# Patient Record
Sex: FEMALE | Race: WHITE | NOT HISPANIC OR LATINO | Employment: OTHER | URBAN - METROPOLITAN AREA
[De-identification: names, ages, dates, MRNs, and addresses within clinical notes are randomized per-mention and may not be internally consistent; named-entity substitution may affect disease eponyms.]

---

## 2023-03-09 LAB
NON-UH HIE A/G RATIO: 1.5
NON-UH HIE ALB: 4.1 G/DL (ref 3.4–5)
NON-UH HIE ALK PHOS: 78 UNIT/L (ref 46–116)
NON-UH HIE BILIRUBIN, TOTAL: 0.9 MG/DL (ref 0.2–1)
NON-UH HIE BUN/CREAT RATIO: 20
NON-UH HIE BUN: 20 MG/DL (ref 9–23)
NON-UH HIE CALCIUM: 9.9 MG/DL (ref 8.7–10.4)
NON-UH HIE CALCULATED LDL CHOLESTEROL: 75 MG/DL (ref 60–130)
NON-UH HIE CALCULATED OSMOLALITY: 276 MOSM/KG (ref 275–295)
NON-UH HIE CHLORIDE: 99 MMOL/L (ref 98–107)
NON-UH HIE CHOLESTEROL: 162 MG/DL (ref 100–200)
NON-UH HIE CO2, VENOUS: 28 MMOL/L (ref 20–31)
NON-UH HIE CREATININE: 1 MG/DL (ref 0.5–0.8)
NON-UH HIE GFR AA: >60
NON-UH HIE GLOBULIN: 2.8 G/DL
NON-UH HIE GLOMERULAR FILTRATION RATE: 53 ML/MIN/1.73M?
NON-UH HIE GLUCOSE: 120 MG/DL (ref 74–106)
NON-UH HIE GOT: 21 UNIT/L (ref 15–37)
NON-UH HIE GPT: 19 UNIT/L (ref 10–49)
NON-UH HIE HCT: 42.8 % (ref 36–46)
NON-UH HIE HDL CHOLESTEROL: 62 MG/DL (ref 40–60)
NON-UH HIE HGB A1C: 5.6 %
NON-UH HIE HGB: 14.7 G/DL (ref 12–16)
NON-UH HIE INSTR WBC ND: 9.1
NON-UH HIE K: 3.9 MMOL/L (ref 3.5–5.1)
NON-UH HIE MCH: 31.6 PG (ref 27–34)
NON-UH HIE MCHC: 34.4 G/DL (ref 32–37)
NON-UH HIE MCV: 92.1 FL (ref 80–100)
NON-UH HIE MPV: 7.6 FL (ref 7.4–10.4)
NON-UH HIE NA: 136 MMOL/L (ref 135–145)
NON-UH HIE PLATELET: 351 X10 (ref 150–450)
NON-UH HIE RBC: 4.65 X10 (ref 4.2–5.4)
NON-UH HIE RDW: 12.6 % (ref 11.5–14.5)
NON-UH HIE TOTAL CHOL/HDL CHOL RATIO: 2.6
NON-UH HIE TOTAL PROTEIN: 6.9 G/DL (ref 5.7–8.2)
NON-UH HIE TRIGLYCERIDES: 125 MG/DL (ref 30–150)
NON-UH HIE TSH: 1.47 UIU/ML (ref 0.55–4.78)
NON-UH HIE VIT D 25: 27 NG/ML
NON-UH HIE WBC: 9.1 X10 (ref 4.5–11)

## 2023-03-13 ENCOUNTER — TELEPHONE (OUTPATIENT)
Dept: PRIMARY CARE | Facility: CLINIC | Age: 83
End: 2023-03-13
Payer: MEDICARE

## 2023-03-13 NOTE — TELEPHONE ENCOUNTER
----- Message from Cat Cunha DO sent at 3/9/2023  8:20 PM EST -----  Please let pt know their blood counts, sugar, electrolytes, thyroid, liver, and kidney function are all normal and her cholesterol was good.    Her kidney function was slightly elevated at 1 (should be less than 0.8).    Her vitamin D level is low at 27(should be above 30).  I recommend they take at least 2000 units of an OTC vitamin D supplement daily and we will cont to monitor.

## 2023-05-26 ENCOUNTER — TELEPHONE (OUTPATIENT)
Dept: PRIMARY CARE | Facility: CLINIC | Age: 83
End: 2023-05-26
Payer: MEDICARE

## 2023-05-26 DIAGNOSIS — R41.3 MEMORY LOSS: ICD-10-CM

## 2023-05-26 DIAGNOSIS — R53.1 GENERALIZED WEAKNESS: ICD-10-CM

## 2023-05-26 DIAGNOSIS — R29.6 FREQUENT FALLS: ICD-10-CM

## 2023-05-26 PROBLEM — R42 DYSEQUILIBRIUM: Status: ACTIVE | Noted: 2023-05-26

## 2023-05-26 NOTE — TELEPHONE ENCOUNTER
Patients daughter left a message stating she has been falling a lot and refuses to do anything about it to help herself.  They are asking if you can order PT for her, but possibly also order Home Health.  Her and her  refuse to move into assisted living.  She is concerned about her mother as she always has bruises on her face from falling.

## 2023-08-08 ENCOUNTER — OFFICE VISIT (OUTPATIENT)
Dept: PRIMARY CARE | Facility: CLINIC | Age: 83
End: 2023-08-08
Payer: MEDICARE

## 2023-08-08 ENCOUNTER — LAB (OUTPATIENT)
Dept: LAB | Facility: LAB | Age: 83
End: 2023-08-08
Payer: MEDICARE

## 2023-08-08 VITALS
DIASTOLIC BLOOD PRESSURE: 84 MMHG | HEART RATE: 82 BPM | WEIGHT: 153.4 LBS | TEMPERATURE: 98.5 F | BODY MASS INDEX: 28.23 KG/M2 | SYSTOLIC BLOOD PRESSURE: 138 MMHG | HEIGHT: 62 IN

## 2023-08-08 DIAGNOSIS — R73.09 ELEVATED GLUCOSE: ICD-10-CM

## 2023-08-08 DIAGNOSIS — N39.0 ACUTE UTI: ICD-10-CM

## 2023-08-08 DIAGNOSIS — E55.9 MILD VITAMIN D DEFICIENCY: ICD-10-CM

## 2023-08-08 DIAGNOSIS — R42 DYSEQUILIBRIUM: ICD-10-CM

## 2023-08-08 DIAGNOSIS — R53.83 OTHER FATIGUE: ICD-10-CM

## 2023-08-08 DIAGNOSIS — R29.6 FREQUENT FALLS: ICD-10-CM

## 2023-08-08 DIAGNOSIS — I10 BENIGN ESSENTIAL HTN: ICD-10-CM

## 2023-08-08 DIAGNOSIS — R47.01 EXPRESSIVE APHASIA: ICD-10-CM

## 2023-08-08 DIAGNOSIS — Z87.828 HISTORY OF HEAD INJURY: ICD-10-CM

## 2023-08-08 DIAGNOSIS — Z29.89 NEED FOR PROPHYLAXIS AGAINST URINARY TRACT INFECTION: ICD-10-CM

## 2023-08-08 DIAGNOSIS — R53.83 OTHER FATIGUE: Primary | ICD-10-CM

## 2023-08-08 DIAGNOSIS — E27.9 DISORDER OF ADRENAL GLAND, UNSPECIFIED (MULTI): ICD-10-CM

## 2023-08-08 PROBLEM — K21.9 GERD (GASTROESOPHAGEAL REFLUX DISEASE): Status: ACTIVE | Noted: 2023-08-08

## 2023-08-08 PROBLEM — R30.0 DYSURIA: Status: ACTIVE | Noted: 2023-08-08

## 2023-08-08 PROBLEM — R35.0 URINE FREQUENCY: Status: ACTIVE | Noted: 2023-08-08

## 2023-08-08 PROBLEM — E78.5 HYPERLIPIDEMIA: Status: ACTIVE | Noted: 2023-08-08

## 2023-08-08 LAB
POC APPEARANCE, URINE: CLEAR
POC BILIRUBIN, URINE: NEGATIVE
POC BLOOD, URINE: NEGATIVE
POC COLOR, URINE: YELLOW
POC GLUCOSE, URINE: NEGATIVE MG/DL
POC KETONES, URINE: NEGATIVE MG/DL
POC LEUKOCYTES, URINE: NEGATIVE
POC NITRITE,URINE: NEGATIVE
POC PH, URINE: 5 PH
POC PROTEIN, URINE: NORMAL MG/DL
POC SPECIFIC GRAVITY, URINE: >=1.03
POC UROBILINOGEN, URINE: 0.2 EU/DL

## 2023-08-08 PROCEDURE — 82306 VITAMIN D 25 HYDROXY: CPT

## 2023-08-08 PROCEDURE — 81003 URINALYSIS AUTO W/O SCOPE: CPT | Performed by: FAMILY MEDICINE

## 2023-08-08 PROCEDURE — 3079F DIAST BP 80-89 MM HG: CPT | Performed by: FAMILY MEDICINE

## 2023-08-08 PROCEDURE — 99214 OFFICE O/P EST MOD 30 MIN: CPT | Performed by: FAMILY MEDICINE

## 2023-08-08 PROCEDURE — 87086 URINE CULTURE/COLONY COUNT: CPT

## 2023-08-08 PROCEDURE — 3075F SYST BP GE 130 - 139MM HG: CPT | Performed by: FAMILY MEDICINE

## 2023-08-08 PROCEDURE — 1160F RVW MEDS BY RX/DR IN RCRD: CPT | Performed by: FAMILY MEDICINE

## 2023-08-08 PROCEDURE — 1159F MED LIST DOCD IN RCRD: CPT | Performed by: FAMILY MEDICINE

## 2023-08-08 PROCEDURE — 36415 COLL VENOUS BLD VENIPUNCTURE: CPT

## 2023-08-08 PROCEDURE — 1036F TOBACCO NON-USER: CPT | Performed by: FAMILY MEDICINE

## 2023-08-08 PROCEDURE — 84443 ASSAY THYROID STIM HORMONE: CPT

## 2023-08-08 PROCEDURE — 80053 COMPREHEN METABOLIC PANEL: CPT

## 2023-08-08 PROCEDURE — 83036 HEMOGLOBIN GLYCOSYLATED A1C: CPT

## 2023-08-08 PROCEDURE — 85027 COMPLETE CBC AUTOMATED: CPT

## 2023-08-08 RX ORDER — LISINOPRIL 20 MG/1
2 TABLET ORAL DAILY
COMMUNITY
Start: 2014-06-09 | End: 2023-10-12 | Stop reason: ALTCHOICE

## 2023-08-08 RX ORDER — AMLODIPINE BESYLATE 5 MG/1
10 TABLET ORAL DAILY
COMMUNITY
Start: 2021-09-09 | End: 2023-10-12 | Stop reason: ALTCHOICE

## 2023-08-08 RX ORDER — CEPHALEXIN 250 MG/1
1 CAPSULE ORAL DAILY
COMMUNITY
Start: 2021-06-07 | End: 2023-08-08 | Stop reason: SDUPTHER

## 2023-08-08 RX ORDER — CEPHALEXIN 250 MG/1
250 CAPSULE ORAL DAILY
Qty: 90 CAPSULE | Refills: 1 | Status: SHIPPED | OUTPATIENT
Start: 2023-08-08 | End: 2023-10-13 | Stop reason: SDUPTHER

## 2023-08-08 RX ORDER — SIMVASTATIN 20 MG/1
1 TABLET, FILM COATED ORAL DAILY
COMMUNITY
Start: 2014-06-06 | End: 2023-10-12 | Stop reason: ALTCHOICE

## 2023-08-08 RX ORDER — NITROFURANTOIN 25; 75 MG/1; MG/1
100 CAPSULE ORAL 2 TIMES DAILY
Qty: 14 CAPSULE | Refills: 0 | Status: SHIPPED | OUTPATIENT
Start: 2023-08-08 | End: 2023-08-15

## 2023-08-08 RX ORDER — LATANOPROST 50 UG/ML
SOLUTION/ DROPS OPHTHALMIC
COMMUNITY
Start: 2016-09-22

## 2023-08-08 RX ORDER — OXYBUTYNIN CHLORIDE 5 MG/1
TABLET ORAL
COMMUNITY
Start: 2023-04-26 | End: 2023-08-08

## 2023-08-08 ASSESSMENT — PATIENT HEALTH QUESTIONNAIRE - PHQ9
2. FEELING DOWN, DEPRESSED OR HOPELESS: NOT AT ALL
SUM OF ALL RESPONSES TO PHQ9 QUESTIONS 1 AND 2: 0
1. LITTLE INTEREST OR PLEASURE IN DOING THINGS: NOT AT ALL

## 2023-08-08 NOTE — PROGRESS NOTES
"Subjective   Patient ID: Justyna Joe is a 83 y.o. female who presents for UTI (Urine frequency, slight dysuria.  Started a few days ago).    HPI   83-year-old female presents with her daughter co a few day hx of an increase in urinary frequency, urgency, and slight dysuria; has also had an inc in incontinence  No fever or chills. No gross hematuria.      hx HTN/HLD/prediabetes/vit D defic    History of disequilibrium and feeling off balance and some speech troubles/expressive aphasia and freq falls . Tried physical therapy and speech therapy.   had head CT 10/2021 and carotid US-neg had echo 11/2021;   never followed up with neurologist as recommended   Falling several times a wk.  Has hit her head. Never goes to ER.    Denies any one sided numbness, tingling, weakness  No vision changes    hx of renal cyst/adrenal adenoma-   dr acevedo urologist - has her on keflex 250mg daily for UTI prophylaxis and also on oxybutynin 5 mg - but she stopped the oxybutynin bc didn't feel it ws helping  Had biopsy of adrenal adenoma last yr- neg per pt    BMI 28; wt is down 7 lbs since feb visit and 17 lbs since last sept  Not eating as much  Lives at home with her  and her daughter lives close by and bring them meals. Are looking into meals on wheels.  denies any chest pains, palpitations, edema, shortness of breath, abdominal pains, reflux, nausea, vomiting,  constipation, blood in stool, melena.      GI dwaine- colonoscopy 8/2015 +polyps;   11/2020 cologuard-neg; occ looser stools and urgency         Review of Systems  ROS as stated in HPI    Objective   /84   Pulse 82   Temp 36.9 °C (98.5 °F)   Ht 1.575 m (5' 2\")   Wt 69.6 kg (153 lb 6.4 oz)   BMI 28.06 kg/m²     Physical Exam  Constitutional: A&O; NAD  HEENT: conjunctiva normal. EOMI; PERRLA; lids normal; TM's clear;   Neck: supple; No lymphadenopathy   Heart: RRR     Lungs: CTA bilateral   Abd: Soft, Nontender, Nondistended  Ext:  No edema;    Neuro: slow " speech;   Psych: Judgment, orientation, mood, affect, insight wnl   Assessment/Plan   Problem List Items Addressed This Visit       Dysequilibrium    Relevant Orders    Referral to Neurology    MR brain w and wo IV contrast    Acute UTI    Relevant Medications    nitrofurantoin, macrocrystal-monohydrate, (Macrobid) 100 mg capsule    Other Relevant Orders    Urine Culture    POCT UA Automated manually resulted (Completed)    Benign essential HTN    Relevant Orders    Comprehensive Metabolic Panel    Elevated glucose    Relevant Orders    Comprehensive Metabolic Panel    Hemoglobin A1C    Fatigue - Primary    Relevant Orders    CBC    TSH with reflex to Free T4 if abnormal    Mild vitamin D deficiency    Relevant Orders    Vitamin D, Total    Disorder of adrenal gland, unspecified (CMS/HCC)     Other Visit Diagnoses       Frequent falls        Relevant Orders    Referral to Neurology    MR brain w and wo IV contrast    Expressive aphasia        Relevant Orders    Referral to Neurology    MR brain w and wo IV contrast    History of head injury        Relevant Orders    MR brain w and wo IV contrast    Need for prophylaxis against urinary tract infection        Relevant Medications    cephalexin (Keflex) 250 mg capsule          Check UA/cx  Rx [id number]   Check MRI  Refer to neuro    check labs  monitor BP   Rx refill keflex

## 2023-08-09 ENCOUNTER — TELEPHONE (OUTPATIENT)
Dept: PRIMARY CARE | Facility: CLINIC | Age: 83
End: 2023-08-09
Payer: MEDICARE

## 2023-08-09 LAB
ALANINE AMINOTRANSFERASE (SGPT) (U/L) IN SER/PLAS: 15 U/L (ref 7–45)
ALBUMIN (G/DL) IN SER/PLAS: 4.6 G/DL (ref 3.4–5)
ALKALINE PHOSPHATASE (U/L) IN SER/PLAS: 59 U/L (ref 33–136)
ANION GAP IN SER/PLAS: 14 MMOL/L (ref 10–20)
ASPARTATE AMINOTRANSFERASE (SGOT) (U/L) IN SER/PLAS: 17 U/L (ref 9–39)
BILIRUBIN TOTAL (MG/DL) IN SER/PLAS: 0.6 MG/DL (ref 0–1.2)
CALCIDIOL (25 OH VITAMIN D3) (NG/ML) IN SER/PLAS: 30 NG/ML
CALCIUM (MG/DL) IN SER/PLAS: 10 MG/DL (ref 8.6–10.6)
CARBON DIOXIDE, TOTAL (MMOL/L) IN SER/PLAS: 24 MMOL/L (ref 21–32)
CHLORIDE (MMOL/L) IN SER/PLAS: 101 MMOL/L (ref 98–107)
CREATININE (MG/DL) IN SER/PLAS: 0.83 MG/DL (ref 0.5–1.05)
ERYTHROCYTE DISTRIBUTION WIDTH (RATIO) BY AUTOMATED COUNT: 12.5 % (ref 11.5–14.5)
ERYTHROCYTE MEAN CORPUSCULAR HEMOGLOBIN CONCENTRATION (G/DL) BY AUTOMATED: 33.2 G/DL (ref 32–36)
ERYTHROCYTE MEAN CORPUSCULAR VOLUME (FL) BY AUTOMATED COUNT: 95 FL (ref 80–100)
ERYTHROCYTES (10*6/UL) IN BLOOD BY AUTOMATED COUNT: 4.58 X10E12/L (ref 4–5.2)
ESTIMATED AVERAGE GLUCOSE FOR HBA1C: 120 MG/DL
GFR FEMALE: 70 ML/MIN/1.73M2
GLUCOSE (MG/DL) IN SER/PLAS: 102 MG/DL (ref 74–99)
HEMATOCRIT (%) IN BLOOD BY AUTOMATED COUNT: 43.7 % (ref 36–46)
HEMOGLOBIN (G/DL) IN BLOOD: 14.5 G/DL (ref 12–16)
HEMOGLOBIN A1C/HEMOGLOBIN TOTAL IN BLOOD: 5.8 %
LEUKOCYTES (10*3/UL) IN BLOOD BY AUTOMATED COUNT: 8.4 X10E9/L (ref 4.4–11.3)
NRBC (PER 100 WBCS) BY AUTOMATED COUNT: 0 /100 WBC (ref 0–0)
PLATELETS (10*3/UL) IN BLOOD AUTOMATED COUNT: 353 X10E9/L (ref 150–450)
POTASSIUM (MMOL/L) IN SER/PLAS: 4.3 MMOL/L (ref 3.5–5.3)
PROTEIN TOTAL: 6.9 G/DL (ref 6.4–8.2)
SODIUM (MMOL/L) IN SER/PLAS: 135 MMOL/L (ref 136–145)
THYROTROPIN (MIU/L) IN SER/PLAS BY DETECTION LIMIT <= 0.05 MIU/L: 1.72 MIU/L (ref 0.44–3.98)
UREA NITROGEN (MG/DL) IN SER/PLAS: 26 MG/DL (ref 6–23)
URINE CULTURE: NORMAL

## 2023-08-09 NOTE — TELEPHONE ENCOUNTER
----- Message from Cat Cunha DO sent at 8/9/2023 11:01 AM EDT -----  Please let pt know that her blood counts,  thyroid, liver, and kidney function are all normal  Her sodium was slightly low at 135(should be above 136)  Her sugar was slightly elevated at 102 and her HbA1c, the 3 month average of her sugars was 5.8, which is in the pre-diabetic range (anything 6.5 and above is considered diabetic).  I recommend a healthy diet and exercise and we will continue to monitor.   Her  vit D was at lower end of normal at 30(normal range is ) so I recommend she take at least 2000 units of an OTC vitamin D supplement daily and we will cont to monitor.

## 2023-08-10 ENCOUNTER — TELEPHONE (OUTPATIENT)
Dept: PRIMARY CARE | Facility: CLINIC | Age: 83
End: 2023-08-10
Payer: MEDICARE

## 2023-08-10 NOTE — TELEPHONE ENCOUNTER
----- Message from Cat Cunha DO sent at 8/9/2023  3:01 PM EDT -----  Please let pt and her daughter know her urine culture was negative for an infection so I recommend she follow up with her urologist if symptoms persist.

## 2023-08-21 ENCOUNTER — TELEPHONE (OUTPATIENT)
Dept: PRIMARY CARE | Facility: CLINIC | Age: 83
End: 2023-08-21
Payer: MEDICARE

## 2023-08-21 NOTE — TELEPHONE ENCOUNTER
The family   would like the name of the medication that the patient is on to prevent utis. If you could call them at  [phone].

## 2023-09-05 ENCOUNTER — TELEPHONE (OUTPATIENT)
Dept: PRIMARY CARE | Facility: CLINIC | Age: 83
End: 2023-09-05
Payer: MEDICARE

## 2023-09-05 NOTE — TELEPHONE ENCOUNTER
Pt's daughter, Lara called. Her mother, Justyna Lucia, was at [facility]. Surgeon, Dr Chapman wants her to take Cipro, 500 mg. But she has had a bad rash from that in the past. They want to start her on it. Infectious disease dr needs to know more details about the reactions she was having. She is on an IV antibiotic until 9/16. Please call daughter, Lara at [phone]

## 2023-09-06 NOTE — TELEPHONE ENCOUNTER
Informed patients daughter that was all we had.  They will start her on cipro and benadryl and see how she does.

## 2023-09-14 ENCOUNTER — TELEPHONE (OUTPATIENT)
Dept: PRIMARY CARE | Facility: CLINIC | Age: 83
End: 2023-09-14
Payer: MEDICARE

## 2023-09-14 NOTE — TELEPHONE ENCOUNTER
[facility] called to get a verbal order for patient. She is still in sniff, but will be released next week and will need PT and Speech Therapy.  Their number is [phone].

## 2023-09-19 ENCOUNTER — TELEPHONE (OUTPATIENT)
Dept: PRIMARY CARE | Facility: CLINIC | Age: 83
End: 2023-09-19
Payer: MEDICARE

## 2023-09-19 NOTE — TELEPHONE ENCOUNTER
Pt daughter called and said she fell and was in sw for a while and currently getting moved to [facility].

## 2023-09-19 NOTE — TELEPHONE ENCOUNTER
Home health care started therapy, ot, pt, speech therapy yesterday.  They would like a verbal order to start [facility].  [phone]

## 2023-09-21 ENCOUNTER — APPOINTMENT (OUTPATIENT)
Dept: PRIMARY CARE | Facility: CLINIC | Age: 83
End: 2023-09-21
Payer: MEDICARE

## 2023-09-27 ENCOUNTER — TELEPHONE (OUTPATIENT)
Dept: PRIMARY CARE | Facility: CLINIC | Age: 83
End: 2023-09-27
Payer: MEDICARE

## 2023-09-27 NOTE — TELEPHONE ENCOUNTER
[facility] called and said Justyna fell on 9/25 getting out of bed because she didn't use her walker.  Her family was there and she is ok no reported injuries.

## 2023-10-12 ENCOUNTER — OFFICE VISIT (OUTPATIENT)
Dept: PRIMARY CARE | Facility: CLINIC | Age: 83
End: 2023-10-12
Payer: MEDICARE

## 2023-10-12 VITALS
TEMPERATURE: 97.3 F | WEIGHT: 162.4 LBS | DIASTOLIC BLOOD PRESSURE: 83 MMHG | HEIGHT: 62 IN | SYSTOLIC BLOOD PRESSURE: 153 MMHG | HEART RATE: 74 BPM | BODY MASS INDEX: 29.88 KG/M2

## 2023-10-12 DIAGNOSIS — R35.0 URINE FREQUENCY: ICD-10-CM

## 2023-10-12 DIAGNOSIS — Z29.89 NEED FOR PROPHYLAXIS AGAINST URINARY TRACT INFECTION: ICD-10-CM

## 2023-10-12 DIAGNOSIS — E78.5 HYPERLIPIDEMIA, UNSPECIFIED HYPERLIPIDEMIA TYPE: ICD-10-CM

## 2023-10-12 LAB
POC APPEARANCE, URINE: CLEAR
POC BILIRUBIN, URINE: ABNORMAL
POC BLOOD, URINE: NEGATIVE
POC COLOR, URINE: YELLOW
POC GLUCOSE, URINE: NEGATIVE MG/DL
POC KETONES, URINE: NEGATIVE MG/DL
POC LEUKOCYTES, URINE: NEGATIVE
POC NITRITE,URINE: NEGATIVE
POC PH, URINE: 5.5 PH
POC PROTEIN, URINE: ABNORMAL MG/DL
POC SPECIFIC GRAVITY, URINE: >=1.03
POC UROBILINOGEN, URINE: 0.2 EU/DL

## 2023-10-12 PROCEDURE — 99214 OFFICE O/P EST MOD 30 MIN: CPT | Performed by: FAMILY MEDICINE

## 2023-10-12 PROCEDURE — 3077F SYST BP >= 140 MM HG: CPT | Performed by: FAMILY MEDICINE

## 2023-10-12 PROCEDURE — 3079F DIAST BP 80-89 MM HG: CPT | Performed by: FAMILY MEDICINE

## 2023-10-12 PROCEDURE — 87086 URINE CULTURE/COLONY COUNT: CPT

## 2023-10-12 PROCEDURE — 1036F TOBACCO NON-USER: CPT | Performed by: FAMILY MEDICINE

## 2023-10-12 PROCEDURE — 81003 URINALYSIS AUTO W/O SCOPE: CPT | Performed by: FAMILY MEDICINE

## 2023-10-12 PROCEDURE — 1160F RVW MEDS BY RX/DR IN RCRD: CPT | Performed by: FAMILY MEDICINE

## 2023-10-12 PROCEDURE — 1159F MED LIST DOCD IN RCRD: CPT | Performed by: FAMILY MEDICINE

## 2023-10-12 RX ORDER — ATORVASTATIN CALCIUM 10 MG/1
10 TABLET, FILM COATED ORAL DAILY
COMMUNITY
End: 2023-10-12 | Stop reason: SDUPTHER

## 2023-10-12 RX ORDER — DORZOLAMIDE HYDROCHLORIDE AND TIMOLOL MALEATE 20; 5 MG/ML; MG/ML
1 SOLUTION/ DROPS OPHTHALMIC
COMMUNITY
Start: 2016-11-08

## 2023-10-12 RX ORDER — AMLODIPINE BESYLATE 10 MG/1
10 TABLET ORAL DAILY
COMMUNITY

## 2023-10-12 RX ORDER — MELATONIN 3 MG
1 CAPSULE ORAL NIGHTLY
COMMUNITY

## 2023-10-12 RX ORDER — LISINOPRIL 40 MG/1
20 TABLET ORAL 2 TIMES DAILY
COMMUNITY

## 2023-10-12 RX ORDER — CALCIUM CARBONATE 600 MG
1 TABLET ORAL DAILY
COMMUNITY
Start: 2014-06-09

## 2023-10-12 ASSESSMENT — PATIENT HEALTH QUESTIONNAIRE - PHQ9
SUM OF ALL RESPONSES TO PHQ9 QUESTIONS 1 AND 2: 0
1. LITTLE INTEREST OR PLEASURE IN DOING THINGS: NOT AT ALL
2. FEELING DOWN, DEPRESSED OR HOPELESS: NOT AT ALL

## 2023-10-12 NOTE — PROGRESS NOTES
"Subjective   Patient ID: Justyna Joe is a 83 y.o. female who presents for UTI (Urine frequency with dysuria for a week.).    HPI 83-year-old female presents with her daughters for fu and to check for a UTI  Has been getting up freq at night to urinate.   Was hospitalized in aug after a fall and had a lum patient lumbar laminectomy with dr sherwood and then had an infection after and has been seeing wound clinic  is almost done with her cipro  She had been on keflex 250mg daily in past for UTI prophylaxis from her Urolgist and they would like to restart after the cipro       hx HTN/HLD/prediabetes/vit D defic     History of disequilibrium and feeling off balance and some speech troubles/expressive aphasia and freq falls   Has upcoming appt with neuro      hx of renal cyst/adrenal adenoma-   dr acevedo urologist - had tried her  on oxybutynin 5 mg - but she stopped the oxybutynin bc didn't feel it ws helping  Had biopsy of adrenal adenoma in past- neg per pt     BMI 29;   wt is back up a little  Has moved into an apt and will be transitioning to assisted living  Getting meals there; appetite good  Her daughter has been staying with her and her  to help    denies any chest pains, palpitations, edema, shortness of breath, abdominal pains, reflux, nausea, vomiting,  constipation, blood in stool, melena.       GI dwaine- colonoscopy 8/2015 +polyps;   11/2020 cologuard-neg; occ looser stools and urgency       Review of Systems  ROS as stated in HP  Objective   /83   Pulse 74   Temp 36.3 °C (97.3 °F)   Ht 1.575 m (5' 2\")   Wt 73.7 kg (162 lb 6.4 oz)   BMI 29.70 kg/m²     Physical Exam  Constitutional: A&O; NAD  HEENT: conjunctiva normal. EOMI; PERRLA; lids normal; TM's clear;   Neck: supple; No lymphadenopathy   Heart: RRR     Lungs: CTA bilateral   Abd: Soft, Nontender, Nondistended  Ext:  No edema;      Psych: Judgment, orientation, mood, affect, insight wnl   Assessment/Plan   Problem List Items Addressed " This Visit             ICD-10-CM    Hyperlipidemia E78.5    Urine frequency R35.0    Relevant Orders    POCT UA Automated manually resulted (Completed)    Urine Culture        Check UA/cx  Restart daily keflex   Consider fu with urologist  Has upcoming appt with neuro    monitor BP  Rx refill atorvastatin   Will fill out physical forms for assisted living facility

## 2023-10-13 LAB — BACTERIA UR CULT: NORMAL

## 2023-10-13 RX ORDER — CEPHALEXIN 250 MG/1
250 CAPSULE ORAL DAILY
Qty: 90 CAPSULE | Refills: 1 | Status: SHIPPED | OUTPATIENT
Start: 2023-10-13

## 2023-10-13 RX ORDER — ATORVASTATIN CALCIUM 10 MG/1
10 TABLET, FILM COATED ORAL DAILY
Qty: 90 TABLET | Refills: 1 | Status: SHIPPED | OUTPATIENT
Start: 2023-10-13

## 2023-10-16 ENCOUNTER — TELEPHONE (OUTPATIENT)
Dept: PRIMARY CARE | Facility: CLINIC | Age: 83
End: 2023-10-16
Payer: MEDICARE

## 2023-10-16 NOTE — TELEPHONE ENCOUNTER
----- Message from Cat Cunha DO sent at 10/14/2023 10:26 AM EDT -----  Please let pt and her daughters know that her urine culture was negative for an infection.

## 2023-10-30 PROBLEM — E66.9 OBESITY: Status: ACTIVE | Noted: 2023-10-30

## 2023-10-30 PROBLEM — M62.81 MUSCLE WEAKNESS (GENERALIZED): Status: ACTIVE | Noted: 2023-10-30

## 2023-10-30 PROBLEM — D35.00 ADRENAL ADENOMA: Status: ACTIVE | Noted: 2023-10-30

## 2023-10-30 PROBLEM — E87.1 HYPONATREMIA: Status: ACTIVE | Noted: 2023-10-30

## 2023-10-30 PROBLEM — T81.49XA POSTOPERATIVE WOUND INFECTION: Status: ACTIVE | Noted: 2023-08-28

## 2023-10-30 PROBLEM — I10 HYPERTENSION: Status: ACTIVE | Noted: 2023-10-30

## 2023-10-30 PROBLEM — R29.6 RECURRENT FALLS: Status: ACTIVE | Noted: 2023-10-30

## 2023-10-30 PROBLEM — M48.00 SPINAL STENOSIS, SITE UNSPECIFIED: Status: ACTIVE | Noted: 2023-10-30

## 2023-10-30 PROBLEM — H40.9 GLAUCOMA: Status: ACTIVE | Noted: 2023-10-30

## 2023-10-30 PROBLEM — M54.2 NECK PAIN: Status: ACTIVE | Noted: 2023-10-30

## 2023-10-30 PROBLEM — R26.89 OTHER ABNORMALITIES OF GAIT AND MOBILITY: Status: ACTIVE | Noted: 2023-10-30

## 2023-10-30 PROBLEM — R41.3 MEMORY IMPAIRMENT: Status: ACTIVE | Noted: 2023-10-30

## 2023-10-30 PROBLEM — R13.12 DYSPHAGIA, OROPHARYNGEAL PHASE: Status: ACTIVE | Noted: 2023-10-30

## 2023-10-30 PROBLEM — N28.1 RENAL CYST: Status: ACTIVE | Noted: 2023-10-30

## 2023-10-30 PROBLEM — M19.90 DEGENERATIVE JOINT DISEASE: Status: ACTIVE | Noted: 2023-10-30

## 2023-10-30 PROBLEM — F09 MILD COGNITIVE DISORDER: Status: ACTIVE | Noted: 2023-10-30

## 2023-10-30 PROBLEM — I35.1 MODERATE AORTIC REGURGITATION: Status: ACTIVE | Noted: 2023-10-30

## 2023-10-30 PROBLEM — K63.5 POLYP OF COLON: Status: ACTIVE | Noted: 2023-10-30

## 2023-10-30 PROBLEM — I49.9 IRREGULAR HEART RHYTHM: Status: ACTIVE | Noted: 2023-10-30

## 2023-10-30 PROBLEM — M19.90 ARTHRITIS: Status: ACTIVE | Noted: 2023-10-30

## 2023-10-30 RX ORDER — DOCUSATE SODIUM 100 MG/1
100 CAPSULE, LIQUID FILLED ORAL
COMMUNITY
Start: 2023-08-28

## 2023-10-30 RX ORDER — SELENIUM 50 MCG
1 TABLET ORAL DAILY
COMMUNITY
Start: 2023-08-28

## 2023-10-30 RX ORDER — TRAMADOL HYDROCHLORIDE 5 MG/ML
12.5 SOLUTION ORAL ONCE
COMMUNITY
Start: 2023-09-01

## 2023-10-30 RX ORDER — MULTIVITAMIN
1 TABLET ORAL DAILY
COMMUNITY
Start: 2023-08-28

## 2023-11-01 ENCOUNTER — OFFICE VISIT (OUTPATIENT)
Dept: NEUROLOGY | Facility: CLINIC | Age: 83
End: 2023-11-01
Payer: MEDICARE

## 2023-11-01 VITALS
BODY MASS INDEX: 31.84 KG/M2 | HEART RATE: 98 BPM | HEIGHT: 60 IN | DIASTOLIC BLOOD PRESSURE: 77 MMHG | SYSTOLIC BLOOD PRESSURE: 178 MMHG | WEIGHT: 162.2 LBS

## 2023-11-01 DIAGNOSIS — F09 MILD COGNITIVE DISORDER: Primary | ICD-10-CM

## 2023-11-01 DIAGNOSIS — R42 DYSEQUILIBRIUM: ICD-10-CM

## 2023-11-01 DIAGNOSIS — R47.01 EXPRESSIVE APHASIA: ICD-10-CM

## 2023-11-01 DIAGNOSIS — R29.6 FREQUENT FALLS: ICD-10-CM

## 2023-11-01 DIAGNOSIS — M62.81 MUSCLE WEAKNESS (GENERALIZED): ICD-10-CM

## 2023-11-01 PROBLEM — K44.9 HERNIA, HIATAL: Status: ACTIVE | Noted: 2023-08-08

## 2023-11-01 PROCEDURE — 1159F MED LIST DOCD IN RCRD: CPT | Performed by: PSYCHIATRY & NEUROLOGY

## 2023-11-01 PROCEDURE — 3078F DIAST BP <80 MM HG: CPT | Performed by: PSYCHIATRY & NEUROLOGY

## 2023-11-01 PROCEDURE — 1160F RVW MEDS BY RX/DR IN RCRD: CPT | Performed by: PSYCHIATRY & NEUROLOGY

## 2023-11-01 PROCEDURE — 99205 OFFICE O/P NEW HI 60 MIN: CPT | Performed by: PSYCHIATRY & NEUROLOGY

## 2023-11-01 PROCEDURE — 3077F SYST BP >= 140 MM HG: CPT | Performed by: PSYCHIATRY & NEUROLOGY

## 2023-11-01 PROCEDURE — 1126F AMNT PAIN NOTED NONE PRSNT: CPT | Performed by: PSYCHIATRY & NEUROLOGY

## 2023-11-01 PROCEDURE — 1036F TOBACCO NON-USER: CPT | Performed by: PSYCHIATRY & NEUROLOGY

## 2023-11-01 RX ORDER — SIMVASTATIN 20 MG/1
20 TABLET, FILM COATED ORAL NIGHTLY
COMMUNITY

## 2023-11-01 RX ORDER — AMLODIPINE BESYLATE 5 MG/1
5 TABLET ORAL DAILY
COMMUNITY
Start: 2022-08-29

## 2023-11-01 ASSESSMENT — PATIENT HEALTH QUESTIONNAIRE - PHQ9
SUM OF ALL RESPONSES TO PHQ9 QUESTIONS 1 AND 2: 0
2. FEELING DOWN, DEPRESSED OR HOPELESS: NOT AT ALL
1. LITTLE INTEREST OR PLEASURE IN DOING THINGS: NOT AT ALL

## 2023-11-01 ASSESSMENT — ENCOUNTER SYMPTOMS
WEAKNESS: 1
FATIGUE: 1
LIGHT-HEADEDNESS: 1
SPEECH DIFFICULTY: 1

## 2023-11-01 ASSESSMENT — PAIN SCALES - GENERAL: PAINLEVEL: 0-NO PAIN

## 2023-11-01 NOTE — PATIENT INSTRUCTIONS
Would like you to get an MRI C and T spine for further evaluation as well as blood work. Will get the MRI brain pushed into the  system for my review.  Please get Neuro-psych testing for further evaluation of the memory.  Follow up after the Neuro-psych testing (call to schedule a follow up about 1 month after the testing).      Try melatonin up to 10 mg at 7 PM.  If not helping with the restless leg then can try low dose gabapentin.

## 2023-11-01 NOTE — PROGRESS NOTES
Subjective     Justyna Joe is a right handed  83 y.o. year old female who presents with Fall (NPV; frequent falls). Patient is accompanied by: child (2 daughters) .    Visit type: new patient visit    Fall         She has been having a problem with her balance. She can walk straight if she is looking straight however as soon as her head she will fall that direction.  She has gone through PT and OT for the past two months however its not fixing the problem.  There is no dizziness.    Its been going on for at least 8 months.      She had cauda equina - she is more upright after that however she has not had any significant improvement.  Her right leg drags behind the walker.  She can't lift her right arm as high as the left side.  The PT thinks se had a left sided stroke.  Last fall and she fell and hit her head on the bathtub.  She thinks she passed out before.   After she fell she had a decline with the speech.  It kept declining.  She had two surgeries back to back. First August 15th and August 29th. Went back in for concern of infection.     She was only getting 1-2 words out and that has improved. She has been talking in full sentences since the surgery.      At night she will get up every 20 minutes to urinate starting around 3 AM.  She also seems confused at night.  There are no hallucinations.  Sometimes she has episodes where she yells out and that is new.  She has periods where she is confused for days and then days where she is normal.  These happen twice week.  Some changes in behavior with manners.     There is a family history of dementia - Aunt and mother and her brother had frontotemporal dementia. A 1st cousin had frontotemporal dementia with ALS.       Had cognitive evaluation with OT and speech which she did ok with.     Review of Systems   Constitutional:  Positive for fatigue.   Musculoskeletal:  Positive for gait problem.   Neurological:  Positive for speech difficulty, weakness and  light-headedness.   All other systems reviewed and are negative.    All other systems have been reviewed and are negative for complaint.     Past Medical History:   Diagnosis Date    Other specified abnormal findings of blood chemistry     Elevated LFTs    Other specified abnormal findings of blood chemistry 09/09/2019    High serum lactate    Other specified abnormal findings of blood chemistry     Elevated serum creatinine    Personal history of diseases of the blood and blood-forming organs and certain disorders involving the immune mechanism     History of thrombocytosis    Personal history of diseases of the blood and blood-forming organs and certain disorders involving the immune mechanism 09/09/2019    History of anemia    Personal history of other diseases of the digestive system 11/21/2016    History of esophageal reflux    Personal history of other infectious and parasitic diseases     History of herpes zoster    Radiculopathy, lumbar region 01/05/2017    Lumbar radicular pain     Family History   Problem Relation Name Age of Onset    Dementia Mother      Multiple myeloma Mother      Prostate cancer Father      Dementia Brother      Pancreatic cancer Brother       Past Surgical History:   Procedure Laterality Date    APPENDECTOMY  06/06/2014    Appendectomy    LUMBAR LAMINECTOMY      TONSILLECTOMY  06/06/2014    Tonsillectomy    TOTAL ABDOMINAL HYSTERECTOMY W/ BILATERAL SALPINGOOPHORECTOMY  06/06/2014    Total Abdominal Hysterectomy With Removal Of Both Ovaries    TUBAL LIGATION  09/22/2016    Tubal Ligation      Social History     Tobacco Use    Smoking status: Never    Smokeless tobacco: Never   Substance Use Topics    Alcohol use: Not Currently          Objective   Vitals Reviewed.   Neurological Exam    Physical Exam      On general examination, the patient is well appearing and well groomed. Heart is regular, rate and rhythm. Lungs are clear to ausculation bilaterally. There is no peripheral edema.  Normal pedal pulses bilaterally. No carotid bruits.   On neurologic examination, the patient relies on her family to provide the history. She has a soft hoarse voice. The history is related in quite good detail with no obvious deficit of attention, memory or language. Fund of knowledge is adequate. Orientation is intact to person, place and time. There is mild word finding difficulties and sentences are fairly simple with couple word answers.  On cranial nerve exam, the pupils are equal, round and reactive to light. Extraocular movements are full. Visual fields are full to confrontation. There is no bulbofacial weakness or ptosis. The tongue is midline with no wasting or fasciculations. The palate elevates symmetrically. Facial sensation is intact to light touch and pinprick bilaterally. Hearing is intact to finger rub bilaterally. Shoulder shrug is 5/5 bilaterally.  Neck flexion and extension have full strength. Motor examination reveals increased tone in the legs, right side more than left. There is paratonia in the right upper extremity with a palmar grasp reflex present on the right. There is full strength in the proximal and distal muscles of the upper and lower extremities bilaterally. There is no scapular winging. Deep tendon reflexes are brisk at the biceps, 3/4 at the triceps bilaterally and 3/4 at the knee and ankle. Plantar responses are mute on the right and down on the left.  There is no tremor. On coordination testing, finger-nose-finger and heel-knee-shin testing are performed more slowly on the right compared to the left without dysmetria.  Sensory examination reveals absent vibratory sense to the knee bilaterally. Gait is wide based and unsteady with en bloc turns.     Assessment/Plan   Ms. Joe is a 83-year-old woman presenting to the neurology clinic today for initial evaluation of gait disturbance.  The patient's family describes at least an 8-month history of progressive decline in gait,  speech changes and cognitive changes.  She was found to have cauda equina status post surgery without any improvement in her gait.  She does seem to have more apraxia in the right upper and lower extremity as well as increased tone in the legs more predominant on the right side.  Given the signs of myelopathy despite the history of cauda equina recommending spine imaging of the cervical and thoracic spine.  Given her cognitive changes I am recommending neuropsychological testing and I will get a copy of her MRI of the brain from [facility] pushed into the  system for my review.  Suspect an underlying neurodegenerative process at this time given reported history and exam findings.      Follow-up after the neuropsych testing.    Referral to PT for gait imbalance.    Jessica Mena, DO

## 2023-11-02 ENCOUNTER — TELEPHONE (OUTPATIENT)
Dept: NEUROLOGY | Facility: CLINIC | Age: 83
End: 2023-11-02
Payer: MEDICARE

## 2023-11-08 DIAGNOSIS — R41.3 MEMORY IMPAIRMENT: Primary | ICD-10-CM

## 2023-11-20 ENCOUNTER — INPATIENT HOSPITAL (INPATIENT)
Dept: URBAN - METROPOLITAN AREA HOSPITAL 50 | Facility: HOSPITAL | Age: 83
End: 2023-11-20
Payer: MEDICARE

## 2023-11-20 DIAGNOSIS — R14.0 ABDOMINAL DISTENSION (GASEOUS): ICD-10-CM

## 2023-11-20 DIAGNOSIS — N39.0 URINARY TRACT INFECTION, SITE NOT SPECIFIED: ICD-10-CM

## 2023-11-20 DIAGNOSIS — R19.7 DIARRHEA, UNSPECIFIED: ICD-10-CM

## 2023-11-20 DIAGNOSIS — R93.3 ABNORMAL FINDINGS ON DIAGNOSTIC IMAGING OF OTHER PARTS OF DI: ICD-10-CM

## 2023-11-20 PROCEDURE — 99222 1ST HOSP IP/OBS MODERATE 55: CPT | Performed by: INTERNAL MEDICINE

## 2023-11-21 ENCOUNTER — INPATIENT HOSPITAL (INPATIENT)
Dept: URBAN - METROPOLITAN AREA HOSPITAL 50 | Facility: HOSPITAL | Age: 83
End: 2023-11-21
Payer: MEDICARE

## 2023-11-21 DIAGNOSIS — R19.7 DIARRHEA, UNSPECIFIED: ICD-10-CM

## 2023-11-21 DIAGNOSIS — R93.3 ABNORMAL FINDINGS ON DIAGNOSTIC IMAGING OF OTHER PARTS OF DI: ICD-10-CM

## 2023-11-21 DIAGNOSIS — R14.0 ABDOMINAL DISTENSION (GASEOUS): ICD-10-CM

## 2023-11-21 DIAGNOSIS — N39.0 URINARY TRACT INFECTION, SITE NOT SPECIFIED: ICD-10-CM

## 2023-11-21 PROCEDURE — 99233 SBSQ HOSP IP/OBS HIGH 50: CPT | Mod: FS | Performed by: NURSE PRACTITIONER

## 2023-11-22 ENCOUNTER — TELEPHONE (OUTPATIENT)
Dept: NEUROLOGY | Facility: CLINIC | Age: 83
End: 2023-11-22

## 2023-12-07 ENCOUNTER — APPOINTMENT (OUTPATIENT)
Dept: NEUROLOGY | Facility: CLINIC | Age: 83
End: 2023-12-07
Payer: MEDICARE

## 2023-12-20 ENCOUNTER — DOCUMENTATION (OUTPATIENT)
Dept: CARE COORDINATION | Facility: CLINIC | Age: 83
End: 2023-12-20
Payer: MEDICARE

## 2023-12-20 ENCOUNTER — PATIENT OUTREACH (OUTPATIENT)
Dept: CARE COORDINATION | Facility: CLINIC | Age: 83
End: 2023-12-20
Payer: MEDICARE

## 2023-12-20 NOTE — PROGRESS NOTES
[facility]  Admitted 12/12/2023  Discharged 12/15/2023  Dx: Altered Mental Status, COVID 19, Fall    DC  SNF: [facility]  Admitted 12/15/2023  Discharged 12/19/2023    CM spoke with spouse and requested I call back later  Michelle Armstrong RN

## 2024-01-02 ENCOUNTER — PATIENT OUTREACH (OUTPATIENT)
Dept: CARE COORDINATION | Facility: CLINIC | Age: 84
End: 2024-01-02
Payer: MEDICARE

## 2024-01-02 NOTE — PROGRESS NOTES
This care manger reached out to daughter Nan for NELLIE. Patient is back at the AL with 24 pcg. Patient had a fall,  contusion of face. Daughter reports patient is currently residing at [facility] with her  and 24 pcg. Daughter reports patient does have aide services for medication administration, adl's . Daughter reports patient is active with [facility] health care for skilled services. Daughter reports due to balance issues, patient has had multiple falls. She has been discussing with partnering SNF, possible need for patient to transition to SNF/Long term care/memory care. They are hopeful for patient to complete further testing with neurology to obtain plan of care. Will task out primary care manger to follow up after neurology visit.           Cony Bateman , RN   Nurse Care Manager   [facility] Accountable Care Department   [phone]

## 2024-01-16 ENCOUNTER — PATIENT OUTREACH (OUTPATIENT)
Dept: CARE COORDINATION | Facility: CLINIC | Age: 84
End: 2024-01-16
Payer: MEDICARE

## 2024-01-16 NOTE — PROGRESS NOTES
Outreach call to patient/Nan following up on appointment with multiple providers. Nan states, her mom is doing good, they just came back from a walk. Justyna ambulates with assist of walker. Nan states, she still needs help with sitting and standing plus some other things.  Family in treansitional phase from assisted living to something a little safer and long term. Nan with no additional questions at this time.  Will continue to follow.    Michelle Armstrong RN  RN,   [facility]

## 2024-01-25 ENCOUNTER — PATIENT OUTREACH (OUTPATIENT)
Dept: CARE COORDINATION | Facility: CLINIC | Age: 84
End: 2024-01-25
Payer: MEDICARE

## 2024-01-25 NOTE — PROGRESS NOTES
Spoke with daughter Lara  All future outreach calls have been deferred.   Lara states, all of [facility] physician are with [facility].  No additional outreach calls going forward.    Michelle Armstrong RN/CM  [facility] Population Health  [phone]

## 2024-02-20 ENCOUNTER — APPOINTMENT (OUTPATIENT)
Dept: PRIMARY CARE | Facility: CLINIC | Age: 84
End: 2024-02-20
Payer: MEDICARE